# Patient Record
Sex: MALE | Race: WHITE
[De-identification: names, ages, dates, MRNs, and addresses within clinical notes are randomized per-mention and may not be internally consistent; named-entity substitution may affect disease eponyms.]

---

## 2018-08-25 ENCOUNTER — HOSPITAL ENCOUNTER (OUTPATIENT)
Dept: HOSPITAL 62 - ER | Age: 63
Setting detail: OBSERVATION
LOS: 2 days | Discharge: HOME | End: 2018-08-27
Attending: INTERNAL MEDICINE | Admitting: INTERNAL MEDICINE
Payer: COMMERCIAL

## 2018-08-25 DIAGNOSIS — E11.51: ICD-10-CM

## 2018-08-25 DIAGNOSIS — Z95.1: ICD-10-CM

## 2018-08-25 DIAGNOSIS — F17.210: ICD-10-CM

## 2018-08-25 DIAGNOSIS — I95.89: ICD-10-CM

## 2018-08-25 DIAGNOSIS — I25.10: ICD-10-CM

## 2018-08-25 DIAGNOSIS — R55: Primary | ICD-10-CM

## 2018-08-25 DIAGNOSIS — Z89.421: ICD-10-CM

## 2018-08-25 DIAGNOSIS — Z91.14: ICD-10-CM

## 2018-08-25 DIAGNOSIS — Z95.828: ICD-10-CM

## 2018-08-25 DIAGNOSIS — R79.1: ICD-10-CM

## 2018-08-25 LAB
ADD MANUAL DIFF: NO
ALBUMIN SERPL-MCNC: 4 G/DL (ref 3.5–5)
ALP SERPL-CCNC: 66 U/L (ref 38–126)
ALT SERPL-CCNC: 24 U/L (ref 21–72)
ANION GAP SERPL CALC-SCNC: 12 MMOL/L (ref 5–19)
APPEARANCE UR: CLEAR
APTT BLD: 27.5 SEC (ref 23.5–35.8)
APTT PPP: YELLOW S
AST SERPL-CCNC: 18 U/L (ref 17–59)
BARBITURATES UR QL SCN: NEGATIVE
BASOPHILS # BLD AUTO: 0.1 10^3/UL (ref 0–0.2)
BASOPHILS NFR BLD AUTO: 0.9 % (ref 0–2)
BILIRUB DIRECT SERPL-MCNC: 0.2 MG/DL (ref 0–0.4)
BILIRUB SERPL-MCNC: 0.6 MG/DL (ref 0.2–1.3)
BILIRUB UR QL STRIP: NEGATIVE
BUN SERPL-MCNC: 12 MG/DL (ref 7–20)
CALCIUM: 9.8 MG/DL (ref 8.4–10.2)
CHLORIDE SERPL-SCNC: 103 MMOL/L (ref 98–107)
CK MB SERPL-MCNC: 3.25 NG/ML (ref ?–4.55)
CK SERPL-CCNC: 69 U/L (ref 55–170)
CO2 SERPL-SCNC: 25 MMOL/L (ref 22–30)
EOSINOPHIL # BLD AUTO: 0.3 10^3/UL (ref 0–0.6)
EOSINOPHIL NFR BLD AUTO: 2.3 % (ref 0–6)
ERYTHROCYTE [DISTWIDTH] IN BLOOD BY AUTOMATED COUNT: 15.8 % (ref 11.5–14)
ETHANOL SERPL-MCNC: < 10 MG/DL
GLUCOSE SERPL-MCNC: 153 MG/DL (ref 75–110)
GLUCOSE UR STRIP-MCNC: NEGATIVE MG/DL
HCT VFR BLD CALC: 39.6 % (ref 37.9–51)
HGB BLD-MCNC: 13.2 G/DL (ref 13.5–17)
INR PPP: 0.97
KETONES UR STRIP-MCNC: NEGATIVE MG/DL
LYMPHOCYTES # BLD AUTO: 3.4 10^3/UL (ref 0.5–4.7)
LYMPHOCYTES NFR BLD AUTO: 27.1 % (ref 13–45)
MCH RBC QN AUTO: 28.9 PG (ref 27–33.4)
MCHC RBC AUTO-ENTMCNC: 33.3 G/DL (ref 32–36)
MCV RBC AUTO: 87 FL (ref 80–97)
METHADONE UR QL SCN: NEGATIVE
MONOCYTES # BLD AUTO: 1.2 10^3/UL (ref 0.1–1.4)
MONOCYTES NFR BLD AUTO: 9.4 % (ref 3–13)
NEUTROPHILS # BLD AUTO: 7.6 10^3/UL (ref 1.7–8.2)
NEUTS SEG NFR BLD AUTO: 60.3 % (ref 42–78)
NITRITE UR QL STRIP: NEGATIVE
PCP UR QL SCN: NEGATIVE
PH UR STRIP: 5 [PH] (ref 5–9)
PLATELET # BLD: 292 10^3/UL (ref 150–450)
POTASSIUM SERPL-SCNC: 4.6 MMOL/L (ref 3.6–5)
PROT SERPL-MCNC: 7.2 G/DL (ref 6.3–8.2)
PROT UR STRIP-MCNC: NEGATIVE MG/DL
PROTHROMBIN TIME: 13.4 SEC (ref 11.4–15.4)
RBC # BLD AUTO: 4.56 10^6/UL (ref 4.35–5.55)
SODIUM SERPL-SCNC: 140.3 MMOL/L (ref 137–145)
SP GR UR STRIP: 1.04
TOTAL CELLS COUNTED % (AUTO): 100 %
TROPONIN I SERPL-MCNC: < 0.012 NG/ML
URINE AMPHETAMINES SCREEN: NEGATIVE
URINE BENZODIAZEPINES SCREEN: NEGATIVE
URINE COCAINE SCREEN: NEGATIVE
URINE MARIJUANA (THC) SCREEN: NEGATIVE
UROBILINOGEN UR-MCNC: NEGATIVE MG/DL (ref ?–2)
WBC # BLD AUTO: 12.6 10^3/UL (ref 4–10.5)

## 2018-08-25 PROCEDURE — 85025 COMPLETE CBC W/AUTO DIFF WBC: CPT

## 2018-08-25 PROCEDURE — 82962 GLUCOSE BLOOD TEST: CPT

## 2018-08-25 PROCEDURE — 82553 CREATINE MB FRACTION: CPT

## 2018-08-25 PROCEDURE — G0378 HOSPITAL OBSERVATION PER HR: HCPCS

## 2018-08-25 PROCEDURE — 83880 ASSAY OF NATRIURETIC PEPTIDE: CPT

## 2018-08-25 PROCEDURE — 85730 THROMBOPLASTIN TIME PARTIAL: CPT

## 2018-08-25 PROCEDURE — 93010 ELECTROCARDIOGRAM REPORT: CPT

## 2018-08-25 PROCEDURE — 85027 COMPLETE CBC AUTOMATED: CPT

## 2018-08-25 PROCEDURE — 81001 URINALYSIS AUTO W/SCOPE: CPT

## 2018-08-25 PROCEDURE — 93005 ELECTROCARDIOGRAM TRACING: CPT

## 2018-08-25 PROCEDURE — 80053 COMPREHEN METABOLIC PANEL: CPT

## 2018-08-25 PROCEDURE — 83036 HEMOGLOBIN GLYCOSYLATED A1C: CPT

## 2018-08-25 PROCEDURE — 71275 CT ANGIOGRAPHY CHEST: CPT

## 2018-08-25 PROCEDURE — 80048 BASIC METABOLIC PNL TOTAL CA: CPT

## 2018-08-25 PROCEDURE — 36415 COLL VENOUS BLD VENIPUNCTURE: CPT

## 2018-08-25 PROCEDURE — 80307 DRUG TEST PRSMV CHEM ANLYZR: CPT

## 2018-08-25 PROCEDURE — 82550 ASSAY OF CK (CPK): CPT

## 2018-08-25 PROCEDURE — 85610 PROTHROMBIN TIME: CPT

## 2018-08-25 PROCEDURE — 84484 ASSAY OF TROPONIN QUANT: CPT

## 2018-08-25 PROCEDURE — 99291 CRITICAL CARE FIRST HOUR: CPT

## 2018-08-25 PROCEDURE — 71045 X-RAY EXAM CHEST 1 VIEW: CPT

## 2018-08-25 NOTE — EKG REPORT
SEVERITY:- ABNORMAL ECG -

SINUS RHYTHM

CONSIDER ANTEROSEPTAL INFARCT

:

Confirmed by: Roxana Howard 25-Aug-2018 20:49:45

## 2018-08-25 NOTE — ER DOCUMENT REPORT
ED Syncope and Near Syncope





- General


Chief Complaint: Near Syncope


Stated Complaint: NEAR SYNCOPE


Time Seen by Provider: 08/25/18 16:28


Notes: 





63-year-old male to emergency department chief complaint of near syncope.  

Patient states that he is new in town.  Moved from [place].  Has not seen 

a regular doctor.  On Coumadin.  Had stent placed in his left lower extremity 

due to vascular occlusions approximately 6 weeks ago.  Is supposed to be on 

Coumadin.  Is supposed to be on a bunch of medications.  He has stopped some of 

them but does not know if that is what is going on.  He was started on some 

Wellbutrin.  Took it for about 3 or 4 days and he did not like the way it made 

him feel so stopped it yesterday.  Has not felt well for couple of days.  Did 

not feel like eating today but went to a bar and had some beer and felt a 

little dizzy and felt like he was going to pass out.  Denies passing out.  

Denies any chest pain or shortness of breath.  Denies any symptoms at this 

time.  States that he feels better after getting some IV fluids by EMS.


TRAVEL OUTSIDE OF THE [place] IN LAST 30 DAYS: No





- HPI


Symptoms prior to episode: Dizziness, Sweaty





- Related Data


Allergies/Adverse Reactions: 


 





No Known Allergies Allergy (Verified 08/25/18 16:32)


 











Past Medical History





- General


Information source: Patient





- Social History


Smoking Status: Unknown if Ever Smoked


Frequency of alcohol use: Social


Drug Abuse: None


Lives with: Spouse/Significant other


Family History: Reviewed & Not Pertinent


Patient has suicidal ideation: No


Patient has homicidal ideation: No


Endocrine Medical History: Reports: Hx Diabetes Mellitus Type 2


Renal/ Medical History: Denies: Hx Peritoneal Dialysis


Past Surgical History: Reports: Hx Cardiac Catheterization - stent, Hx Cardiac 

Surgery - CABG





Review of Systems





- Review of Systems


Notes: 





Constitutional: denies: Chills, Diaphoresis, Fever, Malaise,. Complain of 

Weakness





EENT: denies: Eye discharge, Blurred vision, Tearing, Double vision, Nose 

congestion, Nose discharge, Throat swelling, Mouth pain





Cardiovascular: denies: Palpitations, Heart racing, Orthopnea, Dyspnea, Chest 

pain.  complains of dizziness and almost passing out





Respiratory: denies: Cough, Hurts to breathe, Wheezing, Shortness of breath





Gastrointestinal: denies: Abdominal pain, Diarrhea, Nausea, Vomiting, Black 

stools, bright red blood in stool





Genitourinary: denies: Burning, Dysuria, Discharge, Frequency, Flank pain, 

Hematuria





Musculoskeletal/extremities:.  States that had recent vascular surgery to the 

left lower extremity.  Denies: Joint pain, Joint swelling, Muscle pain, Muscle 

stiffness, back pain





Hematologic/Lymphatic:  denies: Anemia, Easy bleeding, Easy bruising, Blood 

clots





Neurological/Psychological: denies: Confusion, Dementia, Depression, Loss of 

consciousness





Skin: No lesions, no masses, no skin breakdown, no abscesses





Physical Exam





- Vital signs


Vitals: 


 











Temp Resp Pulse Ox


 


 98.1 F   18   100 


 


 08/25/18 16:25  08/25/18 16:25  08/25/18 16:25











Interpretation: Normal





- General


General appearance: Appears well, Alert





- HEENT


Head: Normocephalic, Atraumatic


Eyes: Normal


Pupils: PERRL





- Respiratory


Respiratory status: No respiratory distress


Chest status: Nontender


Breath sounds: Normal


Chest palpation: Normal





- Cardiovascular


Rhythm: Regular


Heart sounds: Normal auscultation


Murmur: No





- Abdominal


Inspection: Normal


Distension: No distension


Bowel sounds: Normal


Tenderness: Nontender


Organomegaly: No organomegaly





- Back


Back: Normal, Nontender





- Extremities


General upper extremity: Normal inspection, Nontender, Normal color, Normal ROM

, Normal temperature


General lower extremity: Other - There are healing incisions bilateral left 

lower extremity.  There is no active drainage noted at this time.  Not tense.  

Posterior tibialis pulses present in the left lower extremity.





- Neurological


Neuro grossly intact: Yes


Cognition: Normal


Orientation: AAOx4


[place] Coma Scale Eye Opening: Spontaneous


[place] Coma Scale Verbal: Oriented


[place] Coma Scale Motor: Obeys Commands


[place] Coma Scale Total: 15


Speech: Normal


Motor strength normal: LUE, RUE, LLE, RLE


Sensory: Normal





- Psychological


Associated symptoms: Normal affect, Normal mood





- Skin


Skin Temperature: Warm


Skin Moisture: Dry


Skin Color: Normal





Course





- Re-evaluation


Re-evalutation: 





08/25/18 18:39


Patient's has a slightly elevated WBC count.  Was subtherapeutic on his INR 

with an INR of 0.9 with recent vascular surgery in the last 6 weeks there was 

concern that it could potentially have pulmonary embolism causing his episodes 

of CT angiogram was performed which was unremarkable.  Chest x-ray 

unremarkable.  CTA negative for significant pulmonary embolism.  Blood pressure 

transiently low heart rate is 67.  Alcohol level was unremarkable.  Getting 

fluid bolus at this time.


08/25/18 19:15


Patient reevaluated.  Feeling a little bit better.  Based on the fact the 

patient had a near syncopal episode, as a patient with a recent surgery, has a 

history of cardiac disease, had transient hypotension.  Full more comfortable 

observing overnight for repeat cardiac labs and at least cardiac monitoring.  

Will consult with hospitalist for admit at this time.





- Vital Signs


Vital signs: 


 











Temp Pulse Resp BP Pulse Ox


 


 98.1 F      19   132/57 H  99 


 


 08/25/18 16:25     08/25/18 19:21  08/25/18 19:21  08/25/18 19:21














- Laboratory


Result Diagrams: 


 08/25/18 16:27





 08/25/18 17:10


Laboratory results interpreted by me: 


 











  08/25/18 08/25/18





  16:27 17:10


 


WBC  12.6 H 


 


Hgb  13.2 L 


 


RDW  15.8 H 


 


Glucose   153 H














- EKG Interpretation by Me


EKG shows normal: Sinus rhythm, Axis, Intervals, QRS Complexes, ST-T Waves





Critical Care Note





- Critical Care Note


Total time excluding time spent on procedures (mins): 35


Comments: 





hypotension, near syncope





Discharge





- Discharge


Clinical Impression: 


 Syncope, near, Transient hypotension





Condition: Good


Disposition: ADMITTED AS OBSERVATION


Admitting Provider: Hospitalist [facility]


Unit Admitted: Telemetry

## 2018-08-25 NOTE — PDOC H&P
History of Present Illness


Admission Date/PCP: 





None


Patient complains of: near syncope


History of Present Illness: 


ANTOINETTE BROOKS is a 63 year old male who comes to the emergency department 

after an episode of near syncope.  Patient tells me that he has not eaten 

anything since yesterday, he did not have breakfast but took all his medications

, after noon decided to go to a bar, he had one beer and felt dizzy, lightheaded

, girlfriend noticed him pale.  Decided to come to the ED.


Patient does not see her regular doctor.  Patient is on Coumadin but his inr is 

supratherapeutic.  Patient moved from [place]. States he is on many of 

medications, on Wellbutrin recently which he is stopped as did not make him 

feel good.


Denies shortness of breath, chest pain, abdominal pain, nausea, vomiting, fever

, chills, diarrhea, dysuria, hematuria or frequency.


Transient hypotension which improves after IV fluids





CTA negative PE, EKG sinus rhythm, first set of troponins negative.  Chest x-

ray unremarkable





Past Medical History


Cardiac Medical History: Reports: Coronary Artery Disease - CABG x3, Peripheral 

Vascular Disease - Left lower extremity stent placed about 2 months ago. Right 

lower extremity


Endocrine Medical History: Reports: Diabetes Mellitus Type 2


Psychiatric Medical History: Reports: Depression





Past Surgical History


Past Surgical History: Reports: Cardiac Catheterization - stent, Cholecystectomy

, Coronary Artery Bypass Graft - x3, Other - Right toe amputation as diabetic 

complication





Social History


Information Source: Patient


Lives with: Spouse/Significant other


Smoking Status: Current Every Day Smoker - Pack per day


Frequency of Alcohol Use: Social


Drugs: None





Family History


Family History: Reviewed & Not Pertinent


Parental Family History Reviewed: No - He did not meet his parents


Children Family History Reviewed: NA


Sibling(s) Family History Reviewed.: Yes - Negative





Medication/Allergy


Allergies/Adverse Reactions: 


 





No Known Allergies Allergy (Verified 08/25/18 16:32)


 











Review of Systems


Review of Systems: 





As outlined in the HPI, all others negative





Physical Exam


Vital Signs: 


 











Temp Pulse Resp BP Pulse Ox


 


 98.1 F      14   118/58 L  98 


 


 08/25/18 16:25     08/25/18 20:01  08/25/18 20:01  08/25/18 19:41








 Intake & Output











 08/24/18 08/25/18 08/26/18





 06:59 06:59 06:59


 


Weight   83.2 kg











Additional comments: 


General appearance: Well-developed,  alert and cooperative, and appears to be 

in no acute distress


Head: Normocephalic


Eyes: PEERL, EOMI, vision is grossly intact.  Ears: External auditory canal and 

tympanic membranes clear, hearing grossly intact.  Nose: No nasal discharge.  

Throat: Oral cavity and pharynx normal.  No inflammation, swelling, exudate or 

lesions.


Neck: Neck supple, nontender without lymphadenopathy, masses or thyromegaly.


Cardiac: Normal S1 and S2.  No S3, S4 or murmurs.  Rhythm is regular.  There is 

1+ edema, cyanosis or pallor.  Extremities are warm and well perfused.  

Capillary refill is less than 2 seconds.  No carotid bruits.


Lungs: Clear to auscultation and percussion without rales, mild rhonchi, mild 

wheezing, minimal diminished breath sounds.  Not using accessory muscles.


Abdomen: Positive bowel sounds.  Soft.  Nondistended, nontender.  No guarding 

or rebound.  No masses.  No hepatosplenomegaly


Extremities: No significant deformity or joint abnormality.  Peripheral pulses 

intact.  Right toe amputation.  Scars from surgery both extremities


Neurological: Cranial nerves II through XII grossly intact.  Strength and 

sensation symmetric and intact throughout.  Reflexes 2+ throughout.


Skin: Skin normal color, texture and turgor with no lesions or eruptions, warm 

and dry.


Psychiatric:  The mental examination revealed the patient was oriented to person

, place, and time.  The patient was able to demonstrate good judgment on recent

, without hallucinations, abnormal affect or abnormal behaviors.





Results


Laboratory Results: 


 





 08/25/18 16:27 





 08/25/18 17:10 





 











  08/25/18 08/25/18 08/25/18





  16:27 16:27 17:10


 


WBC  12.6 H  


 


RBC  4.56  


 


Hgb  13.2 L  


 


Hct  39.6  


 


MCV  87  


 


MCH  28.9  


 


MCHC  33.3  


 


RDW  15.8 H  


 


Plt Count  292  


 


Seg Neutrophils %  60.3  


 


Lymphocytes %  27.1  


 


Monocytes %  9.4  


 


Eosinophils %  2.3  


 


Basophils %  0.9  


 


Absolute Neutrophils  7.6  


 


Absolute Lymphocytes  3.4  


 


Absolute Monocytes  1.2  


 


Absolute Eosinophils  0.3  


 


Absolute Basophils  0.1  


 


Sodium   Cancelled  140.3


 


Potassium   Cancelled  4.6


 


Chloride   Cancelled  103


 


Carbon Dioxide   Cancelled  25


 


Anion Gap   Cancelled  12


 


BUN   Cancelled  12


 


Creatinine   Cancelled  1.04


 


Est GFR ( Amer)   Cancelled  > 60


 


Est GFR (Non-Af Amer)   Cancelled  > 60


 


Glucose   Cancelled  153 H


 


Calcium   Cancelled  9.8


 


Total Bilirubin   Cancelled  0.6


 


AST   Cancelled  18


 


ALT   Cancelled  24


 


Alkaline Phosphatase   Cancelled  66


 


Total Protein   Cancelled  7.2


 


Albumin   Cancelled  4.0








 











  08/25/18 08/25/18 08/25/18





  16:27 16:27 17:10


 


Creatine Kinase  Cancelled   69


 


CK-MB (CK-2)   Cancelled 


 


Troponin I   Cancelled 














  08/25/18





  17:10


 


Creatine Kinase 


 


CK-MB (CK-2)  3.25


 


Troponin I  < 0.012











Impressions: 


 





Chest X-Ray  08/25/18 16:30


IMPRESSION:  NO ACUTE RADIOGRAPHIC FINDING IN THE CHEST.


 








Chest/Abdomen CTA  08/25/18 17:24


IMPRESSION:  1.  No evidence of pulmonary embolus or acute cardiopulmonary 

abnormality.


2.  Limited evaluation of the upper abdomen demonstrates cholecystectomy 

changes and pneumobilia.


 














Assessment & Plan





- Diagnosis


(1) Syncope, near


Is this a current diagnosis for this admission?: Yes   


Plan: 


Patient with near syncopal episode which improved IV fluids.  As he has h/o of 

peripheral vascular disease, CAD, felt safe to keep the patient under 

observation, on telemetry monitoring, will do cardiac enzymes 3.  Most likely 

this episode has been triggered by his fasting since the day before and has 

been vasovagal.








(2) Transient hypotension


Is this a current diagnosis for this admission?: Yes   


Plan: 


Improved with IV fluids, will give the patient with IV fluids overnight for 

good hydration.  Orthostatic vital signs were not done in the emergency 

department and the patient already had 1 L of IV fluids.  Will request 

orthostatic vital signs








(3) Diabetes mellitus type 2 in nonobese


Is this a current diagnosis for this admission?: Yes   


Plan: 


Accu-Cheks q. before meals and at bedtime, insulin sliding scale, hypoglycemia 

protocol.  Will resume home diabetic medication.  Hemoglobin A1c in the morning








(4) Peripheral vascular disease


Is this a current diagnosis for this admission?: Yes   


Plan: 


Patient is supposed to be on Coumadin but apparently is noncompliant with his 

medications.  I will reinitiate him on anticoagulation.








(5) Smoker


Is this a current diagnosis for this admission?: Yes   


Plan: 


Will place 21 mg per day of nicotine patch.








- Time


Time Spent: 30 to 50 Minutes

## 2018-08-25 NOTE — RADIOLOGY REPORT (SQ)
EXAM DESCRIPTION:  CHEST SINGLE VIEW



COMPLETED DATE/TIME:  8/25/2018 4:46 pm



REASON FOR STUDY:  syncope



COMPARISON:  None.



EXAM PARAMETERS:  NUMBER OF VIEWS: One view.

TECHNIQUE: Single frontal radiographic view of the chest acquired.

RADIATION DOSE: NA

LIMITATIONS: None.



FINDINGS:  LUNGS AND PLEURA: No opacities, masses or pneumothorax. No pleural effusion.

MEDIASTINUM AND HILAR STRUCTURES: No masses.  Contour normal.

HEART AND VASCULAR STRUCTURES: Heart normal in size.  Normal vasculature.

BONES: No acute findings.

HARDWARE: Patient is status post median sternotomy.

OTHER: No other significant finding.



IMPRESSION:  NO ACUTE RADIOGRAPHIC FINDING IN THE CHEST.



TECHNICAL DOCUMENTATION:  JOB ID:  [id number]

 2011 [employer]- All Rights Reserved



Reading location - IP/workstation name: [email or web address]

## 2018-08-25 NOTE — RADIOLOGY REPORT (SQ)
EXAM DESCRIPTION:  CTA CHEST



COMPLETED DATE/TIME:  8/25/2018 6:05 pm



REASON FOR STUDY:  sob, recent sx, near syncope



COMPARISON:  Chest x-ray 8/25/2018



TECHNIQUE:  CT scan of the chest performed using helical scanning technique with dynamic intravenous 
contrast injection.  Images reviewed with lung, soft tissue and bone windows.  Reconstructed coronal 
and sagittal MPR images reviewed.

Additional 3 dimensional post-processing performed to develop Maximal Intensity Projection images (MI
P).  All images stored on PACS.

All CT scanners at this facility use dose modulation, iterative reconstruction, and/or weight based d
osing when appropriate to reduce radiation dose to as low as reasonably achievable (ALARA).

[facility]: Dose Right  [facility]: CareDose    [facility]: Dose Right    [facility]: Teradose 4D    [facility]: Smart Technologies



CONTRAST TYPE AND DOSE:  contrast/concentration: Isovue 350.00 mg/ml; Total Contrast Delivered: 76.0 
ml; Total Saline Delivered: 80.0 ml

Contrast bolus optimized for the pulmonary arteries. Not diagnostic for the aorta.



RENAL FUNCTION:  BUN 12; creatinine 1.04



RADIATION DOSE:  CT Rad equipment meets quality standard of care and radiation dose reduction techniq
ues were employed. CTDIvol: 14.6 - 33.1 mGy. DLP: 664 mGy-cm. .



LIMITATIONS:  None.



FINDINGS:  LUNGS AND PLEURA: No masses, infiltrates, or pneumothorax.  No pleural effusions or pleura
l calcifications.

AORTA AND GREAT VESSELS: No aneurysm.  Contrast bolus not optimized for the aorta.

HEART: No pericardial effusion. Coronary stents are present.

PULMONARY ARTERIES: No emboli visualized in the main pulmonary arteries or the segmental branches.

HILAR AND MEDIASTINAL STRUCTURES: No identified masses or abnormal nodes.

HARDWARE: Median sternotomy wires.

UPPER ABDOMEN: Limited exam.  Status post cholecystectomy.  With a dilated common bile duct and pneum
obilia.

THYROID AND OTHER SOFT TISSUES: No masses.  No adenopathy.

BONES: No acute or significant finding.

3D MIPS: Confirm above findings.

OTHER: No other significant finding.



IMPRESSION:  1.  No evidence of pulmonary embolus or acute cardiopulmonary abnormality.

2.  Limited evaluation of the upper abdomen demonstrates cholecystectomy changes and pneumobilia.



COMMENT:  Quality ID # [id number]: Final reports with documentation of one or more dose reduction techniques
 (e.g., Automated exposure control, adjustment of the mA and/or kV according to patient size, use of 
iterative reconstruction technique)



TECHNICAL DOCUMENTATION:  JOB ID:  [id number]

 2011 [employer]- All Rights Reserved



Reading location - IP/workstation name: [email or web address]

## 2018-08-26 LAB
ANION GAP SERPL CALC-SCNC: 8 MMOL/L (ref 5–19)
BUN SERPL-MCNC: 12 MG/DL (ref 7–20)
CALCIUM: 9 MG/DL (ref 8.4–10.2)
CHLORIDE SERPL-SCNC: 108 MMOL/L (ref 98–107)
CO2 SERPL-SCNC: 25 MMOL/L (ref 22–30)
GLUCOSE SERPL-MCNC: 165 MG/DL (ref 75–110)
POTASSIUM SERPL-SCNC: 4.1 MMOL/L (ref 3.6–5)
SODIUM SERPL-SCNC: 140.9 MMOL/L (ref 137–145)

## 2018-08-26 RX ADMIN — SODIUM CHLORIDE PRN MLS/HR: 9 INJECTION, SOLUTION INTRAVENOUS at 14:47

## 2018-08-26 RX ADMIN — ENOXAPARIN SODIUM SCH MG: 80 INJECTION SUBCUTANEOUS at 21:27

## 2018-08-26 NOTE — PDOC PROGRESS REPORT
Subjective


Progress Note for:: 08/26/18


Subjective:: 


63 y.o. M who presented to the ED 8/25 for a near syncopal episode.  PMH 

includes CAD, CABG x3 (ASA and plavix), PAD - Left lower extremity stent placed 

about 2 months ago (Coumadin). Right lower extremity bypass graft "years ago."





The patient was seen this morning on rounds, states he is feeling 'much better' 

following IVF resuscitation.  Syncopal episode believed to be related to 

anorexia 24 hours followed by consumption of EtOH.  Unfortunately, the patient'

s INR is subtherapeutic (0.94).  The patient states he last had his INR checked 

3 weeks ago in [place] and his levels were 'normal.'  Since moving to 

[place], [place] 3 weeks ago, the patient has not established himself with a 

primary care doctor.  The patient admits that he is close to running out of his 

Coumadin but is very adamant that he has been taking the medication as 

directed. 





Plan to discharge the patient home on Lovenox to bridge to therapeutic Coumadin 

dosing. Will require assistance from discharge planning because the patient 

previously had Medicaid when living in [place], and his insurance coverage did not 

transfer to [place]. 


Reason For Visit: 


NEAR SYNCOPE








Physical Exam


Vital Signs: 


 











Temp Pulse Resp BP Pulse Ox


 


 97.7 F   63   16   135/49 H  99 


 


 08/26/18 15:23  08/26/18 15:23  08/26/18 15:23  08/26/18 15:23  08/26/18 15:23








 Intake & Output











 08/25/18 08/26/18 08/27/18





 06:59 06:59 06:59


 


Intake Total   1240


 


Output Total  675 300


 


Balance  -675 940











General appearance: PRESENT: no acute distress, well-developed, well-nourished


Head exam: PRESENT: atraumatic, normocephalic


Eye exam: PRESENT: conjunctiva pink, EOMI, PERRLA.  ABSENT: scleral icterus


Ear exam: PRESENT: normal external ear exam


Mouth exam: PRESENT: moist, tongue midline


Neck exam: ABSENT: carotid bruit, JVD, lymphadenopathy, thyromegaly


Respiratory exam: PRESENT: clear to auscultation hiro.  ABSENT: rales, rhonchi, 

wheezes


Cardiovascular exam: PRESENT: RRR.  ABSENT: diastolic murmur, rubs, systolic 

murmur


Pulses: PRESENT: normal dorsalis pedis pul


Vascular exam: PRESENT: normal capillary refill


GI/Abdominal exam: PRESENT: normal bowel sounds, soft.  ABSENT: distended, 

guarding, mass, organolmegaly, rebound, tenderness


Rectal exam: PRESENT: deferred


Extremities exam: PRESENT: full ROM.  ABSENT: calf tenderness, clubbing, pedal 

edema


Musculoskeletal exam: PRESENT: ambulatory, full ROM


Neurological exam: PRESENT: alert, awake, oriented to person, oriented to place

, oriented to time, oriented to situation


Psychiatric exam: PRESENT: appropriate affect, normal mood.  ABSENT: homicidal 

ideation, suicidal ideation


Skin exam: PRESENT: dry, intact, warm.  ABSENT: cyanosis, rash





Results


Laboratory Results: 


 





 08/26/18 02:40 





 











  08/25/18 08/26/18





  20:51 02:40


 


Sodium   140.9


 


Potassium   4.1


 


Chloride   108 H


 


Carbon Dioxide   25


 


Anion Gap   8


 


BUN   12


 


Creatinine   0.74


 


Est GFR ( Amer)   > 60


 


Est GFR (Non-Af Amer)   > 60


 


Glucose   165 H


 


Calcium   9.0


 


Urine Color  YELLOW 


 


Urine Appearance  CLEAR 


 


Urine pH  5.0 


 


Ur Specific Gravity  1.042 


 


Urine Protein  NEGATIVE 


 


Urine Glucose (UA)  NEGATIVE 


 


Urine Ketones  NEGATIVE 


 


Urine Blood  NEGATIVE 


 


Urine Nitrite  NEGATIVE 


 


Ur Leukocyte Esterase  NEGATIVE 


 


Urine WBC (Auto)  1 


 


Urine RBC (Auto)  0 








 











  08/26/18 08/26/18 08/26/18





  02:40 13:22 13:22


 


Troponin I  < 0.012  < 0.012 


 


NT-Pro-B Natriuret Pep    402











Impressions: 


 





Chest X-Ray  08/25/18 16:30


IMPRESSION:  NO ACUTE RADIOGRAPHIC FINDING IN THE CHEST.


 








Chest/Abdomen CTA  08/25/18 17:24


IMPRESSION:  1.  No evidence of pulmonary embolus or acute cardiopulmonary 

abnormality.


2.  Limited evaluation of the upper abdomen demonstrates cholecystectomy 

changes and pneumobilia.


 











Status: Imported from PACS





Assessment & Plan





- Diagnosis


(1) Syncope, near


Is this a current diagnosis for this admission?: Yes   


Plan: 


Witnessed near syncopal episode


Etiology believed to be secondary to anorexia times 24 hours followed by EtOH 

consumption


CT head negative


Significant improvement with IVF resuscitation


Normal orthostatic vital signs








(2) Subtherapeutic anticoagulation


Is this a current diagnosis for this admission?: Yes   


Plan: 


INR 9.4


Patient states he takes his Coumadin as directed


Resume home dosing schedule


Initiate Lovenox SC 1mg/kg 





Will require SC Lovenox upon discharge to bridge to therapeutic Coumadin level


Will require daily/QOD INR checks until therapeutic





Requesting assistance from discharge planning as patient does not have insurance


Barring any complications, plan to discharge home tomorrow








(3) Peripheral vascular disease


Is this a current diagnosis for this admission?: Yes   


Plan: 


Patient endorses history of PAD


History of bypass graft in RLE


Recent stent placement in LLE in June 2018 at [facility] in [place]


Continue home dose warfarin








- Time


Time Spent with patient: 25-34 minutes


Medications reviewed and adjusted accordingly: Yes


Anticipated discharge: Home


Within: within 24 hours





- Inpatient Certification


Based on my medical assessment, after consideration of the patient's 

comorbidities, presenting symptoms, or acuity I expect that the services needed 

warrant INPATIENT care.: Yes


I certify that my determination is in accordance with my understanding of 

Medicare's requirements for reasonable and necessary INPATIENT services [42 CFR 

412.3e].: Yes


Medical Necessity: Risk of Complication if Not Cared For in Hospital





- Plan Summary


Plan Summary: 





In need of assistance from discharge planning services to set this patient up 

with SC Lovenox injections to bridge to therapeutic INR on Coumadin.  While on 

Lovenox, patient will require daily/QOD INR checks by laboratory staff.  

Patient currently does not have health insurance as he just moved to [place] from [place], where he was covered under the [employer] [facility] [place] Medicaid. 





If the patient is not able to afford SC Lovenox, then he will need to remain 

inpatient until his INR levels are therapeutic.

## 2018-08-27 VITALS — DIASTOLIC BLOOD PRESSURE: 49 MMHG | SYSTOLIC BLOOD PRESSURE: 142 MMHG

## 2018-08-27 LAB
ERYTHROCYTE [DISTWIDTH] IN BLOOD BY AUTOMATED COUNT: 15.9 % (ref 11.5–14)
HCT VFR BLD CALC: 35.6 % (ref 37.9–51)
HGB BLD-MCNC: 12 G/DL (ref 13.5–17)
MCH RBC QN AUTO: 28.9 PG (ref 27–33.4)
MCHC RBC AUTO-ENTMCNC: 33.8 G/DL (ref 32–36)
MCV RBC AUTO: 86 FL (ref 80–97)
PLATELET # BLD: 210 10^3/UL (ref 150–450)
RBC # BLD AUTO: 4.16 10^6/UL (ref 4.35–5.55)
WBC # BLD AUTO: 9.1 10^3/UL (ref 4–10.5)

## 2018-08-27 RX ADMIN — ENOXAPARIN SODIUM SCH MG: 80 INJECTION SUBCUTANEOUS at 10:33

## 2018-08-27 RX ADMIN — SODIUM CHLORIDE PRN MLS/HR: 9 INJECTION, SOLUTION INTRAVENOUS at 04:34

## 2018-08-27 NOTE — PHYSICIAN ADVISORY NOTE
Physician Advisor ProgressNote


.: 


Pursuant to the  plan for [facility], I have reviewed the medical record 

for this patient.  





Physician Advisor Statement: 





Pt w/near-syncope.  Received 1 dose warfarin on 8/25, none since.  Home meds 

list includes Coreg & warfarin, so persistently low-nl HRs may be a SE of beta 

blocker rather than an intrinsic problem in setting of initial hypotension.





Status:


[place], not yet with payer source in [place].


Documentation seems to indicate that pt was ready for d/c on 8/26, but needing 

DCP arrangements in place/confirmed w/free clinic (not open on Sun.s) to be 

sure he would be able to access needed meds (Lovenox, ...) that would keep him 

safe outside the hospital.  Low intensity of service (NS @75, Lovenox).  


 - Sounds most appropriate for Obs initially.  


 - If there is a clinical issue today that requires him to stay in hospital for 

tx/monitoring (something that couldn't be taken care of w/daily clinic/outpt 

visits & outpt meds), please document it explicitly, and then may consider 

change to Inpatient status.








Thanks!


CK

## 2018-10-30 ENCOUNTER — HOSPITAL ENCOUNTER (OUTPATIENT)
Dept: HOSPITAL 62 - RAD | Age: 63
End: 2018-10-30
Attending: SURGERY
Payer: COMMERCIAL

## 2018-10-30 DIAGNOSIS — I73.9: Primary | ICD-10-CM

## 2018-10-30 PROCEDURE — 93925 LOWER EXTREMITY STUDY: CPT

## 2018-10-31 NOTE — XCELERA REPORT
[address] [address]

                               Tel: [phone]

                               Fax: [phone]



                      Lower Extremity Arterial Evaluation

_______________________________________________________________________________



Name: ANTOINETTE BROOKS

MRN: [id number]                                       Age: 63 yrs

Gender: Male                                          : 1955

Patient Status: Outpatient                            Patient Location: RAD

Account #: [id number]

Study Date: 10/30/2018 10:51 AM

                          Accession #: [id number]

_______________________________________________________________________________

Procedure: A color flow and duplex scan of the lower extremity arteries was

performed bilaterally with velocity and waveform anaylsis. Ankle brachial

indicies performed.

Reason For Study: PVD







Ordering Physician: WILLIAMS, LENNOX

Performed By: Sarah Beth Elias

_______________________________________________________________________________

_______________________________________________________________________________





Measurements and Calculations



                                     Right  Left

                     Prox PFA PSV    198.9  85.2 cm/sec

                     Prox SFA PSV    53.7   55.8 cm/sec

                     Mid SFA PSV     59.4   52.5 cm/sec

                     Dist SFA PSV    73.5   60.5 cm/sec

                     Prox Pop A PSV  65.7   40.0 cm/sec

                     Mid BUSHRA PSV     21.0   29.1 cm/sec

                     Prox PTA PSV    25.8        cm/sec

                     Mid PTA PSV            44.0 cm/sec



_______________________________________________________________________________

Right Side Arterial Evaluation

Normal velocity and triphasic waveforms noted in the Common Femoral artery.

Biphasic with low normal velocities to the Popliteal. Monophasic in the

infrageniculate vessels. artery.



20-49 % stenosis noted, at the Femoral artery. Sequential changes.



Ankle Brachial index 0.88.



Left Side Arterial Evaluation

Normal velocity and biphasic waveforms noted in the Common Femoral artery .

Biphasic with mildly diminished velocities to the infrageniculate vessels.

Monophasic with severely diminished waveform in the Dorsalis Pedis.



20-49 % stenosis at the Femoral artery. With sequential changes.



Ankle Brachial index is 0.92.



_______________________________________________________________________________

Interpretation Summary

Severe hemodynamically significant lesions in the right lower extremity only,

on duplex imaging, at rest. Moderate hemodynamically significant lesions in

the left lower extremity only, on duplex imaging, at rest. VIK's suggest mild

disease and are not concordant with the duplex findings.

_______________________________________________________________________________

Electronically signed by:      Lennox Williams      on 10/31/2018 05:12 PM



CC: WILLIAMS, LENNOX

>

Williams, Lennox

## 2018-12-20 ENCOUNTER — HOSPITAL ENCOUNTER (EMERGENCY)
Dept: HOSPITAL 62 - ER | Age: 63
Discharge: HOME | End: 2018-12-20
Payer: OTHER GOVERNMENT

## 2018-12-20 VITALS — DIASTOLIC BLOOD PRESSURE: 61 MMHG | SYSTOLIC BLOOD PRESSURE: 148 MMHG

## 2018-12-20 DIAGNOSIS — Z90.49: ICD-10-CM

## 2018-12-20 DIAGNOSIS — M54.6: ICD-10-CM

## 2018-12-20 DIAGNOSIS — E11.9: ICD-10-CM

## 2018-12-20 DIAGNOSIS — R10.11: Primary | ICD-10-CM

## 2018-12-20 DIAGNOSIS — F17.200: ICD-10-CM

## 2018-12-20 DIAGNOSIS — Z95.1: ICD-10-CM

## 2018-12-20 LAB
ADD MANUAL DIFF: NO
ALBUMIN SERPL-MCNC: 3.8 G/DL (ref 3.5–5)
ALP SERPL-CCNC: 75 U/L (ref 38–126)
ALT SERPL-CCNC: 87 U/L (ref 21–72)
ANION GAP SERPL CALC-SCNC: 8 MMOL/L (ref 5–19)
APPEARANCE UR: CLEAR
APTT PPP: YELLOW S
AST SERPL-CCNC: 111 U/L (ref 17–59)
BASOPHILS # BLD AUTO: 0.1 10^3/UL (ref 0–0.2)
BASOPHILS NFR BLD AUTO: 0.3 % (ref 0–2)
BILIRUB DIRECT SERPL-MCNC: 0.3 MG/DL (ref 0–0.4)
BILIRUB SERPL-MCNC: 0.5 MG/DL (ref 0.2–1.3)
BILIRUB UR QL STRIP: NEGATIVE
BUN SERPL-MCNC: 27 MG/DL (ref 7–20)
CALCIUM: 10.1 MG/DL (ref 8.4–10.2)
CHLORIDE SERPL-SCNC: 98 MMOL/L (ref 98–107)
CO2 SERPL-SCNC: 31 MMOL/L (ref 22–30)
EOSINOPHIL # BLD AUTO: 0.1 10^3/UL (ref 0–0.6)
EOSINOPHIL NFR BLD AUTO: 0.7 % (ref 0–6)
ERYTHROCYTE [DISTWIDTH] IN BLOOD BY AUTOMATED COUNT: 15.2 % (ref 11.5–14)
GLUCOSE SERPL-MCNC: 250 MG/DL (ref 75–110)
GLUCOSE UR STRIP-MCNC: >=500 MG/DL
HCT VFR BLD CALC: 38.2 % (ref 37.9–51)
HGB BLD-MCNC: 12.6 G/DL (ref 13.5–17)
KETONES UR STRIP-MCNC: NEGATIVE MG/DL
LIPASE SERPL-CCNC: 45.7 U/L (ref 23–300)
LYMPHOCYTES # BLD AUTO: 2.5 10^3/UL (ref 0.5–4.7)
LYMPHOCYTES NFR BLD AUTO: 16.1 % (ref 13–45)
MCH RBC QN AUTO: 28.5 PG (ref 27–33.4)
MCHC RBC AUTO-ENTMCNC: 33.1 G/DL (ref 32–36)
MCV RBC AUTO: 86 FL (ref 80–97)
MONOCYTES # BLD AUTO: 1.1 10^3/UL (ref 0.1–1.4)
MONOCYTES NFR BLD AUTO: 7.2 % (ref 3–13)
NEUTROPHILS # BLD AUTO: 11.9 10^3/UL (ref 1.7–8.2)
NEUTS SEG NFR BLD AUTO: 75.7 % (ref 42–78)
NITRITE UR QL STRIP: NEGATIVE
PH UR STRIP: 5 [PH] (ref 5–9)
PLATELET # BLD: 323 10^3/UL (ref 150–450)
POTASSIUM SERPL-SCNC: 5.1 MMOL/L (ref 3.6–5)
PROT SERPL-MCNC: 6.7 G/DL (ref 6.3–8.2)
PROT UR STRIP-MCNC: NEGATIVE MG/DL
RBC # BLD AUTO: 4.43 10^6/UL (ref 4.35–5.55)
SODIUM SERPL-SCNC: 136.8 MMOL/L (ref 137–145)
SP GR UR STRIP: 1.03
TOTAL CELLS COUNTED % (AUTO): 100 %
UROBILINOGEN UR-MCNC: NEGATIVE MG/DL (ref ?–2)
WBC # BLD AUTO: 15.8 10^3/UL (ref 4–10.5)

## 2018-12-20 PROCEDURE — 99284 EMERGENCY DEPT VISIT MOD MDM: CPT

## 2018-12-20 PROCEDURE — 80053 COMPREHEN METABOLIC PANEL: CPT

## 2018-12-20 PROCEDURE — 83690 ASSAY OF LIPASE: CPT

## 2018-12-20 PROCEDURE — 36415 COLL VENOUS BLD VENIPUNCTURE: CPT

## 2018-12-20 PROCEDURE — 81001 URINALYSIS AUTO W/SCOPE: CPT

## 2018-12-20 PROCEDURE — 94640 AIRWAY INHALATION TREATMENT: CPT

## 2018-12-20 PROCEDURE — 85025 COMPLETE CBC W/AUTO DIFF WBC: CPT

## 2018-12-20 PROCEDURE — 74177 CT ABD & PELVIS W/CONTRAST: CPT

## 2018-12-20 PROCEDURE — 71046 X-RAY EXAM CHEST 2 VIEWS: CPT

## 2018-12-20 NOTE — ER DOCUMENT REPORT
ED General





- General


Stated Complaint: LOWER BACK PAIN


Time Seen by Provider: 12/20/18 16:34


TRAVEL OUTSIDE OF THE [place] IN LAST 30 DAYS: No





- HPI


Notes: 





Patient is a 63-year-old male with a history of type 2 diabetes, chronic back 

pain, mental health disorder, coronary artery disease status post CABG, 

hypertension who presents to the ED for evaluation of right upper quadrant/side 

pain that occurred prior to arrival.  Patient states that he was in the shower 

when he started having back pain which she has had before.  Patient states that 

he took 2 tramadol and lay down for 45 minutes.  Patient states that he then 

moved to the couch.  When he was transitioning from the couch and walking to his

son room patient states that his legs gave out on him, but he did not have any 

loss of consciousness/dizziness/lightheadedness.  Patient states that he was in 

pain at that time during the occurrence.  Patient states that he did feel weak 

and when EMS arrived they noticed that he had a low blood pressure which 

corrected soon thereafter from 67/ 4/74.  Patient states that he was 

placed on a Z-Howard and prednisone 2 days ago for upper respiratory infection, but

did not have a chest x-ray performed.  Patient states that he does continue to 

smoke and has intermittent wheezing as well.  He is otherwise been eating and 

drinking without any difficulties.  He has been urinating normally and having 

normal bowel movements.  Patient states that his pains have completely resolved 

and he is feeling back to normal.  Significant other does state that patient has

had episodes of dementia and delusions.  He is currently on Zyprexa.  No other 

concerns or complaints.  Denies any headache, fever, head injury, neck pain, 

changes in vision/speech/mentation/hearing, URI, sore throat, chest pain, 

palpitations, syncope, cough, shortness of breath, wheeze, dyspnea, 

nausea/vomiting/diarrhea, urinary retention, dysuria, hematuria, loss of control

of bowel or bladder, numbness/tingling, saddle anesthesia, muscle 

paralysis/weakness, or rash.





- Related Data


Allergies/Adverse Reactions: 


                                        





No Known Allergies Allergy (Verified 08/25/18 16:32)


   











Past Medical History





- Social History


Smoking Status: Current Every Day Smoker


Family History: Reviewed & Not Pertinent





- Past Medical History


Cardiac Medical History: Reports: Hx Coronary Artery Disease - CABG x3, Hx P

eripheral Vascular Disease - Left lower extremity stent placed about 2 months 

ago. Right lower extremity


Endocrine Medical History: Reports: Hx Diabetes Mellitus Type 2


Renal/ Medical History: Denies: Hx Peritoneal Dialysis


Psychiatric Medical History: Reports: Hx Depression


Past Surgical History: Reports: Hx Cardiac Catheterization - stent, Hx Cardiac 

Surgery - CABG, Hx Cholecystectomy, Hx Coronary Artery Bypass Graft - x3, Other 

- Right toe amputation as diabetic complication





Review of Systems





- Review of Systems


-: Yes All other systems reviewed and negative





Physical Exam





- Vital signs


Vitals: 


                                        











Temp Pulse Resp BP Pulse Ox


 


 98.1 F   80   16   144/78 H  97 


 


 12/20/18 16:47  12/20/18 16:47  12/20/18 16:47  12/20/18 16:47  12/20/18 16:47














- Notes


Notes: 





PHYSICAL EXAMINATION:  





GENERAL: Well-appearing, well-nourished and in no acute distress.  A&Ox4.  

Answers questions appropriately.





HEAD: Atraumatic, normocephalic.  Non-tender. 





EYES: Pupils equal round and reactive to light, extraocular movements intact, 

sclera anicteric, conjunctiva are normal.  No nystagmus.  vis fields intact.





ENT: EAC clear b/l.  TM's intact b/l without erythema, fluid, or perforation.  

Nares patent and without discharge.  oropharynx clear without exudates.  No 

tonsilar hypertrophy or erythema.  Moist mucous membranes.  No sinus tenderness.







NECK: Normal range of motion, supple without lymphadenopathy.  No rigidity/

meningismus.  No midline tenderness. 





LUNGS: wheezing b/l.  no crackles.





HEART: Regular rate and rhythm without murmurs, rubs, gallops.





ABDOMEN: Soft, nontender, nondistended abdomen.  No guarding, no rebound.  

Normal bowel sounds present.  No CVA tenderness bilaterally.  Cooper neg.  No 

tenderness at McBurney point.





Musculoskeletal: Ext's b/l:  FROM to passive/active. Strength 5+/5.  No deficits

noted.  No bony tenderness of extremities.





Back:  FROM to passive/active.  Strength 5+/5.  No vertebral point tenderness, 

stepoffs, or deformities.  No other bony tenderness, erythema, swelling, or 

ecchymosis.  SLR negative b/l.  No SI jt tenderness.  No foot drop





Extremities:  No cyanosis, clubbing, or edema b/l.  Peripheral pulses 2+.  

Capillary refill less than 2 seconds.





NEUROLOGICAL: NIH 0.  GCS 15.  Cranial nerves grossly intact.  Normal speech, 

normal gait.  Normal sensory, motor exams.  Reflexes 2+ b/l. OPAL's negative.  

Pronator drift negative. Heel/shin, finger/nose wnl. 





PSYCH: Normal mood, normal affect.





SKIN: Warm, Dry, normal turgor, no rashes or lesions noted.








Course





- Re-evaluation


Re-evalutation: 





12/20/18 18:47


Patient is an afebrile, well-hydrated, 63-year-old male who presents to the ED 

with nonspecific right upper quadrant/right back pain that has been since 

resolved.  Vitals are acceptable without any significant tachycardia, tachypnea,

hypoxia, hypotension.  Patient has not had any recurrence of his symptoms.  PE 

is otherwise unremarkable for any focal neurological deficits.  Patient's 

abdomen is soft and nontender.  CBC does show mildly elevated white blood cell 

count.  CMP has minimally elevated LFTs without elevation in his bili.  Lipase 

and chest x-ray are unremarkable.  CT scan did show some air in his hepatic 

ducts which I did review with the surgeon.  He does not believe that the labs 

and CT finding is of any clinical significance due to the normal bilirubin.  I 

also reviewed with Dr. Kincaid who is in agreement with disposition and plan.

 No further labs or imaging warranted at this time.  Pt has never had any CP, 

SOB, MENDOZA.  Patient is nontoxic-appearing and is tolerating p.o. without 

difficulty.  Low suspicion/risk for cauda equina, disc herniation causing severe

spinal stenosis, spinal abscess, meningitis, acute appendicitis, bowel 

obstruction, acute cholecystitis, perforated diverticulitis, incarcerated 

hernia, pancreatitis, perforated ulcer, peritonitis, sepsis, testicular torsion,

or other systemic emergent condition at this time.  Patient is aware that his 

condition can change from initial presentation and he needs to monitor symptoms 

closely and seek medical attention if any acute changes.  Conservative measures 

otherwise for symptoms.  Recheck with PCM in 3-5 days.  Return to the ED with 

any worsening/concerning symptoms otherwise as reviewed in discharge.  Patient 

is in agreement.








- Vital Signs


Vital signs: 


                                        











Temp Pulse Resp BP Pulse Ox


 


 98.1 F   80   16   144/78 H  97 


 


 12/20/18 16:47  12/20/18 16:47  12/20/18 16:47  12/20/18 16:47  12/20/18 16:47














- Laboratory


Result Diagrams: 


                                 12/20/18 15:48





                                 12/20/18 15:48


Laboratory results interpreted by me: 


                                        











  12/20/18 12/20/18 12/20/18





  15:48 15:48 18:09


 


WBC  15.8 H  


 


Hgb  12.6 L  


 


RDW  15.2 H  


 


Absolute Neutrophils  11.9 H  


 


Sodium   136.8 L 


 


Potassium   5.1 H 


 


Carbon Dioxide   31 H 


 


BUN   27 H 


 


Glucose   250 H 


 


AST   111 H 


 


ALT   87 H 


 


Urine Glucose (UA)    >=500 H














Discharge





- Discharge


Clinical Impression: 


 Right upper quadrant abdominal pain





Thoracic back pain


Qualifiers:


 Chronicity: acute Back pain laterality: right Qualified Code(s): M54.6 - Pain 

in thoracic spine





Condition: Stable


Disposition: HOME, SELF-CARE


Instructions:  Abdominal Pain ([facility])


Additional Instructions: 


Maintain adequate fluid and food intake


healthy diet


tylenol/Motrin if needed


Monitor for any worsening symptoms


Make sure you are staying hydrated enough to urinate and have normal BM's


Recheck with your PCM in 2-3 days


Return to the ED with any worsening symptoms and/or development of fever, 

headache, changes in behavior/mentation/vision/speech, chest pain, palpitations,

syncope, shortness of breath, trouble breathing, abdominal pain, n/v/d, blood in

stool/urine, loss of control of bowel/bladder, urinary retention, muscle 

weakness/paralysis, saddle anesthesia, numbness/tingling, or other worsening 

symptoms that are concerning to you.


Forms:  Elevated Blood Pressure


Referrals: 


TEN TOMAS MD [ACTIVE STAFF] - Follow up as needed


ESTHER CRAWLEY MD [ACTIVE STAFF] - Follow up as needed

## 2018-12-20 NOTE — RADIOLOGY REPORT (SQ)
EXAM DESCRIPTION:  CHEST 2 VIEWS



COMPLETED DATE/TIME:  12/20/2018 4:54 pm



REASON FOR STUDY:  cough/wheeze



COMPARISON:  None.



EXAM PARAMETERS:  NUMBER OF VIEWS: two views

TECHNIQUE: Digital Frontal and Lateral radiographic views of the chest acquired.

RADIATION DOSE: NA

LIMITATIONS: none



FINDINGS:  LUNGS AND PLEURA: No opacities, masses or pneumothorax. No pleural effusion.

MEDIASTINUM AND HILAR STRUCTURES: No masses or contour abnormalities.

HEART AND VASCULAR STRUCTURES: Heart normal size.  No evidence for failure.

BONES: No acute findings.

HARDWARE: None in the chest.

OTHER: No other significant finding.



IMPRESSION:  NO ACUTE RADIOGRAPHIC FINDING IN THE CHEST.



TECHNICAL DOCUMENTATION:  JOB ID:  [id number]

 2011 [employer]- All Rights Reserved



Reading location - IP/workstation name: [email or web address]

## 2018-12-20 NOTE — RADIOLOGY REPORT (SQ)
EXAM DESCRIPTION:  CT ABD/PELVIS WITH IV ONLY



COMPLETED DATE/TIME:  12/20/2018 5:50 pm



REASON FOR STUDY:  Rt abd pain, h/o hypotensive episode



COMPARISON:  None.



TECHNIQUE:  CT scan of the abdomen and pelvis performed using helical scanning technique with dynamic
 intravenous contrast injection.  No oral contrast. Images reviewed with lung, soft tissue, and bone 
windows. Reconstructed coronal and sagittal MPR images reviewed. Delayed images for evaluation of the
 urinary system also acquired. All images stored on PACS.

All CT scanners at this facility use dose modulation, iterative reconstruction, and/or weight based d
osing when appropriate to reduce radiation dose to as low as reasonably achievable (ALARA).

[facility]: Dose Right  [facility]: CareDose    [facility]: Dose Right    [facility]: Teradose 4D    [facility]: Smart Technologies



CONTRAST TYPE AND DOSE:  contrast/concentration: Isovue 350.00 mg/ml; Total Contrast Delivered: 90.0 
ml; Total Saline Delivered: 70.0 ml



RENAL FUNCTION:  Not recorded here.  Refer to CT technologist's record.



RADIATION DOSE:  CT Rad equipment meets quality standard of care and radiation dose reduction techniq
ues were employed. CTDIvol: 6.4 - 8.9 mGy. DLP: 809 mGy-cm..



LIMITATIONS:  None.



FINDINGS:  LOWER CHEST: No significant findings. No nodules or infiltrates.

LIVER: Normal size.  No masses.  There is air in the bile ducts.

SPLEEN: Normal size. No focal lesions.

PANCREAS: No masses. No significant calcifications. No adjacent inflammation or peripancreatic fluid 
collections. Pancreatic duct not dilated.

GALLBLADDER: There is surgical clips in the gallbladder fossa.

ADRENAL GLANDS: No significant masses or asymmetry.

RIGHT KIDNEY AND URETER: No solid masses.   No significant calcifications.   No hydronephrosis or hyd
roureter.

LEFT KIDNEY AND URETER: No solid masses.   No significant calcifications.   No hydronephrosis or hydr
oureter.

AORTA AND VESSELS: No aneurysm.  Atherosclerosis.  There is considerable atherosclerosis in the SMA.

RETROPERITONEUM: No retroperitoneal adenopathy, hemorrhage or masses.

BOWEL AND PERITONEAL CAVITY: No masses or inflammatory changes. No free fluid or peritoneal masses.

APPENDIX: Not identified.

PELVIS: No mass.  No free fluid. Normal bladder.

ABDOMINAL WALL: No masses. No hernias.

BONES: No significant or acute findings.

OTHER: No other significant finding.



IMPRESSION:  There is air in the hepatic ducts.  Has the patient had a sphincterotomy?

There is atherosclerosis.



TECHNICAL DOCUMENTATION:  JOB ID:  [id number]

Quality ID # [id number]: Final reports with documentation of one or more dose reduction techniques (e.g., Au
tomated exposure control, adjustment of the mA and/or kV according to patient size, use of iterative 
reconstruction technique)

 2011 [employer]- All Rights Reserved



Reading location - IP/workstation name: [email or web address]

## 2019-06-29 ENCOUNTER — HOSPITAL ENCOUNTER (EMERGENCY)
Dept: HOSPITAL 62 - ER | Age: 64
Discharge: HOME | End: 2019-06-29
Payer: OTHER GOVERNMENT

## 2019-06-29 VITALS — SYSTOLIC BLOOD PRESSURE: 148 MMHG | DIASTOLIC BLOOD PRESSURE: 52 MMHG

## 2019-06-29 DIAGNOSIS — R10.11: Primary | ICD-10-CM

## 2019-06-29 DIAGNOSIS — I25.10: ICD-10-CM

## 2019-06-29 DIAGNOSIS — I10: ICD-10-CM

## 2019-06-29 DIAGNOSIS — E11.9: ICD-10-CM

## 2019-06-29 DIAGNOSIS — R19.7: ICD-10-CM

## 2019-06-29 DIAGNOSIS — F17.200: ICD-10-CM

## 2019-06-29 LAB
ADD MANUAL DIFF: NO
ALBUMIN SERPL-MCNC: 4.2 G/DL (ref 3.5–5)
ALP SERPL-CCNC: 116 U/L (ref 38–126)
ALT SERPL-CCNC: 76 U/L (ref 21–72)
ANION GAP SERPL CALC-SCNC: 9 MMOL/L (ref 5–19)
AST SERPL-CCNC: 161 U/L (ref 17–59)
BASOPHILS # BLD AUTO: 0.1 10^3/UL (ref 0–0.2)
BASOPHILS NFR BLD AUTO: 1.3 % (ref 0–2)
BILIRUB DIRECT SERPL-MCNC: 0.4 MG/DL (ref 0–0.4)
BILIRUB SERPL-MCNC: 1.6 MG/DL (ref 0.2–1.3)
BUN SERPL-MCNC: 14 MG/DL (ref 7–20)
CALCIUM: 9.8 MG/DL (ref 8.4–10.2)
CHLORIDE SERPL-SCNC: 103 MMOL/L (ref 98–107)
CO2 SERPL-SCNC: 28 MMOL/L (ref 22–30)
EOSINOPHIL # BLD AUTO: 0.2 10^3/UL (ref 0–0.6)
EOSINOPHIL NFR BLD AUTO: 2 % (ref 0–6)
ERYTHROCYTE [DISTWIDTH] IN BLOOD BY AUTOMATED COUNT: 15.6 % (ref 11.5–14)
GLUCOSE SERPL-MCNC: 136 MG/DL (ref 75–110)
HCT VFR BLD CALC: 44.9 % (ref 37.9–51)
HGB BLD-MCNC: 15.2 G/DL (ref 13.5–17)
LYMPHOCYTES # BLD AUTO: 2.5 10^3/UL (ref 0.5–4.7)
LYMPHOCYTES NFR BLD AUTO: 22.1 % (ref 13–45)
MCH RBC QN AUTO: 29.3 PG (ref 27–33.4)
MCHC RBC AUTO-ENTMCNC: 33.8 G/DL (ref 32–36)
MCV RBC AUTO: 87 FL (ref 80–97)
MONOCYTES # BLD AUTO: 1 10^3/UL (ref 0.1–1.4)
MONOCYTES NFR BLD AUTO: 9.1 % (ref 3–13)
NEUTROPHILS # BLD AUTO: 7.3 10^3/UL (ref 1.7–8.2)
NEUTS SEG NFR BLD AUTO: 65.5 % (ref 42–78)
PLATELET # BLD: 269 10^3/UL (ref 150–450)
POTASSIUM SERPL-SCNC: 4.3 MMOL/L (ref 3.6–5)
PROT SERPL-MCNC: 7.2 G/DL (ref 6.3–8.2)
RBC # BLD AUTO: 5.19 10^6/UL (ref 4.35–5.55)
SODIUM SERPL-SCNC: 139.6 MMOL/L (ref 137–145)
TOTAL CELLS COUNTED % (AUTO): 100 %
WBC # BLD AUTO: 11.1 10^3/UL (ref 4–10.5)

## 2019-06-29 PROCEDURE — 80053 COMPREHEN METABOLIC PANEL: CPT

## 2019-06-29 PROCEDURE — 85025 COMPLETE CBC W/AUTO DIFF WBC: CPT

## 2019-06-29 PROCEDURE — 36415 COLL VENOUS BLD VENIPUNCTURE: CPT

## 2019-06-29 PROCEDURE — 99284 EMERGENCY DEPT VISIT MOD MDM: CPT

## 2019-06-29 PROCEDURE — 96374 THER/PROPH/DIAG INJ IV PUSH: CPT

## 2019-06-29 PROCEDURE — 83690 ASSAY OF LIPASE: CPT

## 2019-06-29 NOTE — ER DOCUMENT REPORT
ED Medical Screen (RME)





- General


Chief Complaint: Abdominal Pain


Stated Complaint: ABDOMINAL PAIN


Time Seen by Provider: 06/29/19 12:09


Primary Care Provider: 


WILLIAMS,LENNOX, MD [Primary Care Provider] - Follow up as needed


Mode of Arrival: Medic


Information source: Patient


Notes: 





Patient presents emergency department with complaints of right upper quad 

abdominal pain with diarrhea that started approximately 1/2 hours ago.  Reports 

it woke him up from his sleep.  Reports the pain is sharp when it comes makes 

him break out in a cold sweat.  Denies shortness of breath, chest pain, fever 

vomiting.  Denies recent trip denies trauma.  Patient is a diabetic. 








I have greeted and performed a rapid initial assessment of this patient.  A 

comprehensive ED assessment and evaluation of the patient, analysis of test 

results and completion of the medical decision making process will be conducted 

by additional ED providers.  Dictation of this chart was performed using voice 

recognition software; therefore, there may be some unintended grammatical 

errors.


TRAVEL OUTSIDE OF THE [place] IN LAST 30 DAYS: No





- Related Data


Allergies/Adverse Reactions: 


                                        





No Known Allergies Allergy (Verified 06/29/19 11:43)


   











Past Medical History





- Past Medical History


Cardiac Medical History: Reports: Hx Coronary Artery Disease - CABG x3, Hx 

Hypertension, Hx Peripheral Vascular Disease - Left lower extremity stent placed

about 2 months ago. Right lower extremity


Endocrine Medical History: Reports: Hx Diabetes Mellitus Type 2


Renal/ Medical History: Denies: Hx Peritoneal Dialysis


Psychiatric Medical History: Reports: Hx Depression


Past Surgical History: Reports: Hx Cardiac Catheterization - stent, Hx Cardiac 

Surgery - CABG, Hx Cholecystectomy, Hx Coronary Artery Bypass Graft - x3, Other 

- Right toe amputation as diabetic complication





Physical Exam





- Vital signs


Vitals: 





                                        











Temp Pulse Resp BP Pulse Ox


 


 97.5 F   66   16   98/47 L  95 


 


 06/29/19 11:58  06/29/19 11:58  06/29/19 11:58  06/29/19 11:58  06/29/19 11:58














Course





- Vital Signs


Vital signs: 





                                        











Temp Pulse Resp BP Pulse Ox


 


 97.5 F   66   16   98/47 L  95 


 


 06/29/19 11:58  06/29/19 11:58  06/29/19 11:58  06/29/19 11:58  06/29/19 11:58














Doctor's Discharge





- Discharge


Referrals: 


WILLIAMS,LENNOX, MD [Primary Care Provider] - Follow up as needed

## 2019-06-29 NOTE — ER DOCUMENT REPORT
ED General





- General


Chief Complaint: Abdominal Pain


Stated Complaint: ABDOMINAL PAIN


Time Seen by Provider: 06/29/19 12:09


Primary Care Provider: 


WILLIAMS,LENNOX, MD [ACTIVE STAFF] - Follow up as needed


Mode of Arrival: Medic


TRAVEL OUTSIDE OF THE [place] IN LAST 30 DAYS: No





- HPI


Notes: 





Patient is a 64-year-old gentleman who presents to the emergency department for 

evaluation.  He got home at about 4:30 AM, was picking up a family member in 

[place].  He states that around 6 AM he had 3 episodes of diarrhea.  He denies 

any melena or hematochezia.  He states he was finally able to go to sleep, but 

woke up suddenly at 10 AM with sharp right upper quadrant pain.  He states that 

the pain was a 10 out of a 10 initially, it is now 3 out of 10.  He denies any 

fevers or chills.  No nausea or vomiting.  Normal appetite.  No foreign travel, 

no recent antibiotics, no abnormal ingestions.  He did take some Imodium for the

diarrhea earlier, and it seems to have resolved.





- Related Data


Allergies/Adverse Reactions: 


                                        





No Known Allergies Allergy (Verified 06/29/19 11:43)


   











Past Medical History





- General


Information source: Patient





- Social History


Smoking Status: Current Every Day Smoker


Frequency of alcohol use: Rare


Drug Abuse: None


Family History: Reviewed & Not Pertinent


Patient has suicidal ideation: No


Patient has homicidal ideation: No





- Past Medical History


Cardiac Medical History: Reports: Hx Coronary Artery Disease - CABG x3, Hx 

Hypertension, Hx Peripheral Vascular Disease - Left lower extremity stent placed

about 2 months ago. Right lower extremity


Endocrine Medical History: Reports: Hx Diabetes Mellitus Type 2


Renal/ Medical History: Denies: Hx Peritoneal Dialysis


Psychiatric Medical History: Reports: Hx Depression


Past Surgical History: Reports: Hx Cardiac Catheterization - stent, Hx Cardiac 

Surgery - CABG, Hx Cholecystectomy, Hx Coronary Artery Bypass Graft - x3, Other 

- Right toe amputation as diabetic complication





Review of Systems





- Review of Systems


Constitutional: No symptoms reported


EENT: No symptoms reported


Cardiovascular: No symptoms reported


Respiratory: No symptoms reported


Gastrointestinal: See HPI


Genitourinary: No symptoms reported


Musculoskeletal: No symptoms reported


Skin: No symptoms reported


Neurological/Psychological: No symptoms reported





Physical Exam





- Vital signs


Vitals: 


                                        











Temp Pulse Resp BP Pulse Ox


 


 97.5 F   66   16   98/47 L  95 


 


 06/29/19 11:58  06/29/19 11:58  06/29/19 11:58  06/29/19 11:58  06/29/19 11:58














- Notes


Notes: 





Vital signs reviewed, please refer to chart. Head is normocephalic, atraumatic. 

Pupils equal round, reactive to light.  Neck is supple without meningismus.  

Heart is regular rate and rhythm.  Lungs are clear to auscultation bilaterally. 

Abdomen is soft, mildly tender in the epigastric and right upper quadrant, no 

rebound or guarding, normoactive bowel sounds throughout.  Extremities without 

cyanosis, clubbing. Posterior calves are nontender.  Peripheral pulses are 

equal.  Skin is warm and dry.  Patient is awake, alert, neurological exam is 

nonfocal.





Course





- Re-evaluation


Re-evalutation: 





06/29/19 13:46


Patient presents emergency department for evaluation of abdominal pain and 

diarrhea.  He has had his symptoms overall for under 7 hours.  His pain was 

sharp at first but is significantly improved without any sort of intervention.  

He only had 3 episodes of diarrhea, took Imodium, and has had none since then.  

He does not have a gallbladder.  His pancreatic lipase is normal.  Serial a

bdominal exams are nonsurgical.  I did discuss findings with the patient.  At 

this point he has no significant abnormalities that are concerning.  I did not 

feel an indication to image at this time, particularly given the short duration 

of symptoms.  I explained to him that if his symptoms should persist, or he 

should develop worsening, he should return.  He voiced understanding to this.  

Otherwise we will give him Bentyl here, have him follow-up with primary care, 

return to the ED with worsening or new concerning symptoms of any sort.





- Vital Signs


Vital signs: 


                                        











Temp Pulse Resp BP Pulse Ox


 


 97.5 F   66   16   98/47 L  95 


 


 06/29/19 12:21  06/29/19 12:21  06/29/19 12:21  06/29/19 12:21  06/29/19 12:21














- Laboratory


Result Diagrams: 


                                 06/29/19 12:10





                                 06/29/19 12:10


Laboratory results interpreted by me: 


                                        











  06/29/19 06/29/19





  12:10 12:10


 


WBC  11.1 H 


 


RDW  15.6 H 


 


Glucose   136 H


 


Total Bilirubin   1.6 H


 


AST   161 H


 


ALT   76 H














Discharge





- Discharge


Clinical Impression: 


 Right upper quadrant pain





Diarrhea


Qualifiers:


 Diarrhea type: unspecified type Qualified Code(s): R19.7 - Diarrhea, 

unspecified





Condition: Stable


Disposition: HOME, SELF-CARE


Instructions:  Abdominal Pain ([facility]), Antispasmodics ([facility]), Diarrhea, Nonspecific

([facility])


Additional Instructions: 


No clear cause was found for your pain or diarrhea today.  Rest, clear liquids, 

advance slowly to bland diet.  If your pain worsens, or you develop new or 

concerning symptoms of any sort, return immediately to the emergency department 

for reevaluation.


Referrals: 


WILLIAMS,LENNOX, MD [ACTIVE STAFF] - Follow up as needed

## 2019-08-09 ENCOUNTER — HOSPITAL ENCOUNTER (EMERGENCY)
Dept: HOSPITAL 62 - ER | Age: 64
Discharge: HOME | End: 2019-08-09
Payer: OTHER GOVERNMENT

## 2019-08-09 VITALS — SYSTOLIC BLOOD PRESSURE: 121 MMHG | DIASTOLIC BLOOD PRESSURE: 54 MMHG

## 2019-08-09 DIAGNOSIS — F17.200: ICD-10-CM

## 2019-08-09 DIAGNOSIS — I10: ICD-10-CM

## 2019-08-09 DIAGNOSIS — E11.51: ICD-10-CM

## 2019-08-09 DIAGNOSIS — L72.3: Primary | ICD-10-CM

## 2019-08-09 DIAGNOSIS — L02.212: ICD-10-CM

## 2019-08-09 DIAGNOSIS — I25.10: ICD-10-CM

## 2019-08-09 PROCEDURE — 10060 I&D ABSCESS SIMPLE/SINGLE: CPT

## 2019-08-09 PROCEDURE — 99283 EMERGENCY DEPT VISIT LOW MDM: CPT

## 2019-08-09 NOTE — ER DOCUMENT REPORT
ED General





- General


Chief Complaint: Abscess


Stated Complaint: ABSCESS/BACK


Time Seen by Provider: 08/09/19 09:06


Primary Care Provider: 


MORALES STYLES FNP [NO LOCAL MD] - Follow up as needed


Mode of Arrival: Ambulatory


TRAVEL OUTSIDE OF THE [place] IN LAST 30 DAYS: No





- HPI


Notes: 





Patient is a 64-year-old male who presents to the emergency department for 

evaluation.  He has had a cyst on his back for years.  Over the last week is 

gotten red, started draining.  No fevers or chills.  No nausea or vomiting.  

Eating and drinking normally.  Taking his medications without difficulty.  He is

currently on Plavix as well as Xarelto.  No history of abscesses or MRSA in the 

past.  He states his pain is severe, rates it a 10 out of 10.  States he has not

been sleeping as a result of it.  Wife admits she tried to squeeze it and was 

unsuccessful in obtaining any purulence.





- Related Data


Allergies/Adverse Reactions: 


                                        





No Known Allergies Allergy (Verified 08/09/19 08:55)


   











Past Medical History





- General


Information source: Patient





- Social History


Smoking Status: Current Every Day Smoker


Chew tobacco use (# tins/day): No


Family History: Reviewed & Not Pertinent


Patient has suicidal ideation: No


Patient has homicidal ideation: No





- Past Medical History


Cardiac Medical History: Reports: Hx Coronary Artery Disease - CABG x3, Hx 

Hypertension, Hx Peripheral Vascular Disease - Left lower extremity stent placed

about 2 months ago. Right lower extremity


Endocrine Medical History: Reports: Hx Diabetes Mellitus Type 2


Renal/ Medical History: Denies: Hx Peritoneal Dialysis


Psychiatric Medical History: Reports: Hx Depression


Past Surgical History: Reports: Hx Cardiac Catheterization - stent, Hx Cardiac 

Surgery - CABG, Hx Cholecystectomy, Hx Coronary Artery Bypass Graft - x3, Other 

- Right toe amputation as diabetic complication





Review of Systems





- Review of Systems


Constitutional: No symptoms reported


EENT: No symptoms reported


Cardiovascular: No symptoms reported


Respiratory: No symptoms reported


Gastrointestinal: No symptoms reported


Genitourinary: No symptoms reported


Musculoskeletal: No symptoms reported


Skin: See HPI


Neurological/Psychological: No symptoms reported





Physical Exam





- Vital signs


Vitals: 


                                        











Temp Pulse Resp BP Pulse Ox


 


 98.0 F   81   16   114/48 L  95 


 


 08/09/19 09:03  08/09/19 09:03  08/09/19 09:03  08/09/19 09:03  08/09/19 09:03














- Notes


Notes: 





This is a 64-year-old male who appears his stated age in no acute distress.  

Head is normocephalic and atraumatic.  Pupils are equal round reactive to light.

 Oral mucosa is moist.  Heart regular rate and rhythm, lungs clear to 

oscillation bilaterally.  Examination of the skin of the back healed in 

approximately 4 cm abscess, surrounding erythema mild induration.  There is some

mucopurulent drainage noted from the center of the wound, but it is only about a

1 mm area.





Course





- Re-evaluation


Re-evalutation: 





08/09/19 10:28


Patient presents emergency department for evaluation.  He is on multiple blood 

thinners, but this does not fact need an I&D.  He was given 2 Percocet, I&D set 

up in room.


08/09/19 11:32


Patient tolerated I&D well.  We will send him home with prescription for 

Bactrim.  He was told that he will likely need to see his surgeon/dermatologist 

for removal of this entire sebaceous cyst.  He voiced understanding, [facility] 

[facility]  He is to return to the ED with worsening or new 

concerning symptoms of any sort.





- Vital Signs


Vital signs: 


                                        











Temp Pulse Resp BP Pulse Ox


 


 98.0 F   81   16   114/48 L  95 


 


 08/09/19 09:03  08/09/19 09:03  08/09/19 09:03  08/09/19 09:03  08/09/19 09:03














Discharge





- Discharge


Clinical Impression: 


 Infected sebaceous cyst





Condition: Stable


Disposition: HOME, SELF-CARE


Instructions:  Abscess ([facility]), Post Incision and Drainage, Trimethoprim-Sulfa 

([facility])


Additional Instructions: 


You will likely need referral on to a surgeon for further treatment of this 

cyst.  Take all the antibiotics as prescribed until gone.  Keep area clean with 

soap and water.  If you develop fever, vomiting, or any other new concerning 

symptoms, return immediately to the emergency department for evaluation.


Referrals: 


MORALES STYLES FNP [NO LOCAL MD] - Follow up as needed

## 2019-08-09 NOTE — ER DOCUMENT REPORT
ED Medical Screen (RME)





- General


Chief Complaint: Abscess


Stated Complaint: ABSCESS/BACK


Time Seen by Provider: 08/09/19 09:06


Primary Care Provider: 


MORALES STYLES FNP [Primary Care Provider] - Follow up as needed


Mode of Arrival: Ambulatory


Information source: Patient


Notes: 





64-year-old male presented to ED for complaint of abscess to the middle of his 

back x1-1/2 weeks.  He states he got much worse 4 days ago.  He states it is now

very red swollen and leaking.  It is very painful to the palpation.  He denies 

any previous abscesses in the past.  He does have a history of diabetes high 

blood pressure high cholesterol coronary artery disease with triple bypass 

multiple venous stents throughout his body loss of his left great toe 

gallbladder surgery.  Patient is alert oriented respirations regular nonlabored 

speaking in full sentences walks with even steady gait.


TRAVEL OUTSIDE OF THE [place] IN LAST 30 DAYS: No





- Related Data


Allergies/Adverse Reactions: 


                                        





No Known Allergies Allergy (Verified 08/09/19 08:55)


   











Past Medical History





- Social History


Chew tobacco use (# tins/day): No





- Past Medical History


Cardiac Medical History: Reports: Hx Coronary Artery Disease - CABG x3, Hx Hyp

ertension, Hx Peripheral Vascular Disease - Left lower extremity stent placed 

about 2 months ago. Right lower extremity


Endocrine Medical History: Reports: Hx Diabetes Mellitus Type 2


Renal/ Medical History: Denies: Hx Peritoneal Dialysis


Psychiatric Medical History: Reports: Hx Depression


Past Surgical History: Reports: Hx Cardiac Catheterization - stent, Hx Cardiac 

Surgery - CABG, Hx Cholecystectomy, Hx Coronary Artery Bypass Graft - x3, Other 

- Right toe amputation as diabetic complication





Physical Exam





- Vital signs


Vitals: 





                                        











Temp Pulse Resp BP Pulse Ox


 


 98.0 F   81   16   114/48 L  95 


 


 08/09/19 09:03  08/09/19 09:03  08/09/19 09:03  08/09/19 09:03  08/09/19 09:03














Course





- Vital Signs


Vital signs: 





                                        











Temp Pulse Resp BP Pulse Ox


 


 98.0 F   81   16   114/48 L  95 


 


 08/09/19 09:03  08/09/19 09:03  08/09/19 09:03  08/09/19 09:03  08/09/19 09:03














Doctor's Discharge





- Discharge


Referrals: 


MORALES STYLES FNP [Primary Care Provider] - Follow up as needed

## 2019-08-21 ENCOUNTER — HOSPITAL ENCOUNTER (EMERGENCY)
Dept: HOSPITAL 62 - ER | Age: 64
Discharge: HOME | End: 2019-08-21
Payer: OTHER GOVERNMENT

## 2019-08-21 VITALS — SYSTOLIC BLOOD PRESSURE: 144 MMHG | DIASTOLIC BLOOD PRESSURE: 61 MMHG

## 2019-08-21 DIAGNOSIS — F17.200: ICD-10-CM

## 2019-08-21 DIAGNOSIS — I25.10: ICD-10-CM

## 2019-08-21 DIAGNOSIS — Z98.890: ICD-10-CM

## 2019-08-21 DIAGNOSIS — L97.519: ICD-10-CM

## 2019-08-21 DIAGNOSIS — E11.9: ICD-10-CM

## 2019-08-21 DIAGNOSIS — E11.621: ICD-10-CM

## 2019-08-21 DIAGNOSIS — I10: ICD-10-CM

## 2019-08-21 DIAGNOSIS — M79.671: ICD-10-CM

## 2019-08-21 DIAGNOSIS — I73.9: Primary | ICD-10-CM

## 2019-08-21 DIAGNOSIS — Z79.01: ICD-10-CM

## 2019-08-21 LAB
ADD MANUAL DIFF: NO
ALBUMIN SERPL-MCNC: 4.3 G/DL (ref 3.5–5)
ALP SERPL-CCNC: 109 U/L (ref 38–126)
ANION GAP SERPL CALC-SCNC: 11 MMOL/L (ref 5–19)
APTT BLD: 30.9 SEC (ref 23.5–35.8)
AST SERPL-CCNC: 16 U/L (ref 17–59)
BASOPHILS # BLD AUTO: 0.1 10^3/UL (ref 0–0.2)
BASOPHILS NFR BLD AUTO: 0.8 % (ref 0–2)
BILIRUB DIRECT SERPL-MCNC: 0.2 MG/DL (ref 0–0.4)
BILIRUB SERPL-MCNC: 0.6 MG/DL (ref 0.2–1.3)
BUN SERPL-MCNC: 12 MG/DL (ref 7–20)
CALCIUM: 10.2 MG/DL (ref 8.4–10.2)
CHLORIDE SERPL-SCNC: 98 MMOL/L (ref 98–107)
CO2 SERPL-SCNC: 32 MMOL/L (ref 22–30)
EOSINOPHIL # BLD AUTO: 0.2 10^3/UL (ref 0–0.6)
EOSINOPHIL NFR BLD AUTO: 1.1 % (ref 0–6)
ERYTHROCYTE [DISTWIDTH] IN BLOOD BY AUTOMATED COUNT: 15.5 % (ref 11.5–14)
GLUCOSE SERPL-MCNC: 215 MG/DL (ref 75–110)
HCT VFR BLD CALC: 45.5 % (ref 37.9–51)
HGB BLD-MCNC: 15.1 G/DL (ref 13.5–17)
INR PPP: 0.99
LYMPHOCYTES # BLD AUTO: 2.7 10^3/UL (ref 0.5–4.7)
LYMPHOCYTES NFR BLD AUTO: 17.4 % (ref 13–45)
MCH RBC QN AUTO: 29.3 PG (ref 27–33.4)
MCHC RBC AUTO-ENTMCNC: 33.3 G/DL (ref 32–36)
MCV RBC AUTO: 88 FL (ref 80–97)
MONOCYTES # BLD AUTO: 1.2 10^3/UL (ref 0.1–1.4)
MONOCYTES NFR BLD AUTO: 7.8 % (ref 3–13)
NEUTROPHILS # BLD AUTO: 11.2 10^3/UL (ref 1.7–8.2)
NEUTS SEG NFR BLD AUTO: 72.9 % (ref 42–78)
PLATELET # BLD: 323 10^3/UL (ref 150–450)
POTASSIUM SERPL-SCNC: 4.4 MMOL/L (ref 3.6–5)
PROT SERPL-MCNC: 7.3 G/DL (ref 6.3–8.2)
PROTHROMBIN TIME: 13.1 SEC (ref 11.4–15.4)
RBC # BLD AUTO: 5.18 10^6/UL (ref 4.35–5.55)
TOTAL CELLS COUNTED % (AUTO): 100 %
WBC # BLD AUTO: 15.3 10^3/UL (ref 4–10.5)

## 2019-08-21 PROCEDURE — 36415 COLL VENOUS BLD VENIPUNCTURE: CPT

## 2019-08-21 PROCEDURE — 99284 EMERGENCY DEPT VISIT MOD MDM: CPT

## 2019-08-21 PROCEDURE — 85610 PROTHROMBIN TIME: CPT

## 2019-08-21 PROCEDURE — 87040 BLOOD CULTURE FOR BACTERIA: CPT

## 2019-08-21 PROCEDURE — 96376 TX/PRO/DX INJ SAME DRUG ADON: CPT

## 2019-08-21 PROCEDURE — 85730 THROMBOPLASTIN TIME PARTIAL: CPT

## 2019-08-21 PROCEDURE — 96367 TX/PROPH/DG ADDL SEQ IV INF: CPT

## 2019-08-21 PROCEDURE — 80053 COMPREHEN METABOLIC PANEL: CPT

## 2019-08-21 PROCEDURE — 73630 X-RAY EXAM OF FOOT: CPT

## 2019-08-21 PROCEDURE — 96365 THER/PROPH/DIAG IV INF INIT: CPT

## 2019-08-21 PROCEDURE — 85025 COMPLETE CBC W/AUTO DIFF WBC: CPT

## 2019-08-21 PROCEDURE — 93926 LOWER EXTREMITY STUDY: CPT

## 2019-08-21 NOTE — ER DOCUMENT REPORT
ED Extremity Problem, Lower





- General


Chief Complaint: Foot Pain


Stated Complaint: RIGHT FOOT PAIN


Time Seen by Provider: 08/21/19 17:36


Primary Care Provider: 


[facility] [Primary Care Provider] - Follow up as needed


Notes: 





Patient is a 64-year-old male who presents emergency department with a chief 

complaint of right foot pain.  His pain started this morning.  He has a history 

of peripheral vascular disease and is what prompted him to come to the emergency

department.  He states that he has had a sore in his right second toe for the 

past week.  States that there is some new redness.  Patient has a past surgical 

history of a right lower extremity stent and a left lower extremity graft.  He 

is currently on Plavix and Xarelto.  Patient has a history of a triple bypass, 

cholecystectomy.  Denies any fever, body aches, chills or any other symptoms.


TRAVEL OUTSIDE OF THE [place] IN LAST 30 DAYS: No





- Related Data


Allergies/Adverse Reactions: 


                                        





No Known Allergies Allergy (Verified 08/21/19 17:18)


   











Past Medical History





- General


Information source: Patient





- Social History


Smoking Status: Current Every Day Smoker


Frequency of alcohol use: None


Drug Abuse: None


Family History: Reviewed & Not Pertinent


Patient has suicidal ideation: No


Patient has homicidal ideation: No





- Past Medical History


Cardiac Medical History: Reports: Hx Coronary Artery Disease - CABG x3, Hx Hyper

tension, Hx Peripheral Vascular Disease - Left lower extremity stent placed 

about 2 months ago. Right lower extremity


Endocrine Medical History: Reports: Hx Diabetes Mellitus Type 2


Renal/ Medical History: Denies: Hx Peritoneal Dialysis


Psychiatric Medical History: Reports: Hx Depression


Past Surgical History: Reports: Hx Cardiac Catheterization - stent, Hx Cardiac 

Surgery - CABG, Hx Cholecystectomy, Hx Coronary Artery Bypass Graft - x3, Other 

- Right toe amputation as diabetic complication





Review of Systems





- Review of Systems


Notes: 





REVIEW OF SYSTEMS:





CONSTITUTIONAL :    Denies recent illness.  Denies recent unintentional weight 

loss.  Denies fever,  chills, or sweats. 


EENT: Denies eye, ear, throat, or mouth pain, discharge, or symptoms.  Denies 

nasal or sinus congestion.


CARDIOVASCULAR:  Denies chest pain.


RESPIRATORY: Denies shortness of breath, cough, congestion, difficulty 

breathing, or wheezing. 


GASTROINTESTINAL: Denies nausea, vomiting, and diarrhea.  Denies abdominal pain.

 Denies constipation. 


GENITOURINARY:  Denies difficulty urinating, burning, blood in urine, urgency or

frequency.


MUSCULOSKELETAL: See HPI


SKIN:   Denies rash, itchiness, or lesions


HEMATOLOGIC :   Denies easy bruising or bleeding.


LYMPHATIC:  Denies swollen, painful, enlarged glands.


NEUROLOGICAL: Denies no numbness or tingling denies weakness.  Denies headache. 

Denies altered mental status.  Denies alteration in speech.


PSYCHIATRIC:  Denies stress, anxiety, alteration in sleep patterns, or 

depression.





All other systems reviewed and negative.





Physical Exam





- Vital signs


Vitals: 


                                        











Temp Pulse Resp BP Pulse Ox


 


 97.7 F   81   16   121/53 L  96 


 


 08/21/19 17:23  08/21/19 17:23  08/21/19 17:23  08/21/19 17:23  08/21/19 17:23














- Notes


Notes: 


PHYSICAL EXAMINATION:





GENERAL: Appears stated age, well-nourished, no acute distress. 





HEAD:  Normocephalic, atraumatic.





EYES:  PERRL, conjunctiva normal, all extraocular movements intact, sclera 

nonicteric





ENT:  Moist mucous membranes. 





NECK: Supple, no noticeable swelling, redness, rash.  Normal range of motion.





LUNGS: Equal breath sounds bilaterally and clear to auscultation.  No wheezes 

rales or rhonchi.





CARDIOVASCULAR: S1-S2, regular rate, regular rhythm.  Radial pulses 2+, normal. 

Absent right dorsalis pedis and posterior tibial pulses





ABDOMEN: Normoactive bowel sounds.  Soft, nontender,  no guarding, no rebound 

tenderness, and no masses palpated.





EXTREMITIES: Normal strength and range of motion, no pitting or edema.  No 

cyanosis. 





NEUROLOGICAL: Moves all extremities upon command.  Strength 5/5 in all 

extremities. 





PSYCH: Normal mood, normal affect.





SKIN: Warm, dry.  Erythema with streaking noted to right dorsal foot from second

toe up to foot.  Diabetic foot ulcer noted to tip of right second toe.








Course





- Re-evaluation


Re-evalutation: 





08/21/19 20:26


Dr. Duarte called me with a critical finding of a complete right femoral 

occlusion.  Dr. Last, my attending physician will be consulted.  


08/21/19 20:35


I spoke with Dr. Last and the patient will be started on a Heparin drip. Critical access hospital will be called for transfer.The patient has a 

leukocytosis of 15,000.  Chemistries are unremarkable.  Coagulation studies are 

normal.


08/21/19 20:38


I spoke with  Critical access hospital, but there are no open beds at

this time, was not able to speak with the vacular surgeon on call. Will attempt 

[facility].


08/21/19 20:44


I spoke with Louise from [facility].  The vascular surgeon on-call 

is Dr. Kurtz.  The images will be sent and Dr. Kurtz will give me a call back.


08/21/19 21:32


I spoke with Dr. Kurtz, the vascular surgeon at [facility].  And he 

states that the patient still has good collateral flow of his arteries.  He 

would like to see the patient outpatient first thing in the morning and will 

assess him then. Dr. Kurtz would also like the patient to stop his 

anticoaulation medications because they are not able to perform surgery until 

has has been off his Xeralto for a few days.  I then relayed this information to

Dr. Last and he is stating that due to the patient having a diabetic foot ulcer

with poor blood flow and a history of poor blood flow, it would be best to have 

the patient admitted for IV antibiotics.  I will call Dr. Weiland.


08/21/19 21:56


I spoke with Dr. Weiland, hospitalist physician and he does not want to admit 

the patient at that time.  He is recommending that the patient still follow-up 

with vascular surgeon in the morning and be treated outpatient with clindamycin.

 I have put in wound care orders for the patient's diabetic toe ulcer.  I disc

ussed with the patient the importance of following up with [facility].  He states that he will go there first thing in the morning.  Follow-up 

precautions were given.  Verbal discharge instructions were given to the 

patient.  They verbalized understanding.  They are stable for discharge.














- Vital Signs


Vital signs: 


                                        











Temp Pulse Resp BP Pulse Ox


 


 98.5 F   81   15   144/61 H  97 


 


 08/21/19 23:43  08/21/19 17:23  08/21/19 23:12  08/21/19 23:12  08/21/19 23:12














- Laboratory


Result Diagrams: 


                                 08/21/19 18:26





                                 08/21/19 18:26


Laboratory results interpreted by me: 


                                        











  08/21/19 08/21/19





  18:26 18:26


 


WBC  15.3 H 


 


RDW  15.5 H 


 


Absolute Neuts (auto)  11.2 H 


 


Carbon Dioxide   32 H


 


Glucose   215 H


 


AST   16 L














Discharge





- Discharge


Clinical Impression: 


 Peripheral vascular disease





Diabetic foot ulcer


Qualifiers:


 Diabetic foot ulcer location: toe Diabetes mellitus type: type 2 Laterality: 

right Non-pressure ulcer stage: unspecified non-pressure ulcer stage Qualified 

Code(s): E11.621 - Type 2 diabetes mellitus with foot ulcer





Condition: Stable


Disposition: HOME, SELF-CARE


Additional Instructions: 


You were seen today in the emergency department for right foot pain.  It is 

imperative that you follow-up with the vascular surgeon below in regards to this

visit.  Please follow-up with him first thing in the morning.  The vascular 

surgeon's instructions are to not take your anticoagulant medications.  You are 

also being started on antibiotics for your diabetic foot ulcer.  Please have 

your antibiotics filled in the morning.  You received a dose of antibiotics here

in the emergency department.  You are also being sent home with pain medication.

 You can take 1 tablet every 4-6 hours as needed for your pain.  If you develop 

shortness of breath, difficulty breathing, worsening pain, or have any symptoms 

that are worrisome to you, please return to the emergency department.





Office Hours: 8AM-5PM M-F





Surgery - vascular surgery 


[address], [place] [address] [phone]








***DR. KURTZ WANTS TO SEE THE PATIENT FIRST THING IN THE MORNING***


Prescriptions: 


Clindamycin HCl [Cleocin 150 mg Capsule] 300 mg PO Q6 7 Days #56 capsule


Referrals: 


[facility] [Primary Care Provider] - Follow up as needed

## 2019-08-21 NOTE — RADIOLOGY REPORT (SQ)
EXAM DESCRIPTION:  FOOT RIGHT COMPLETE



COMPLETED DATE/TIME:  8/21/2019 6:40 pm



REASON FOR STUDY:  diabetic wound 2nd toe



COMPARISON:  None.



NUMBER OF VIEWS:  Three views.



TECHNIQUE:  AP, lateral and oblique  radiographic images acquired of the right foot.



LIMITATIONS:  None.



FINDINGS:  MINERALIZATION: Normal.

BONES: No acute fracture or dislocation.  No chi 2nd toe osseous destruction.

JOINTS: No effusions.

SOFT TISSUES: No soft tissue swelling.  No foreign body.  Vascular calcifications.

OTHER: No other significant finding.



IMPRESSION:  No definite radiographic findings of acute osteomyelitis involving the 2nd toe.



TECHNICAL DOCUMENTATION:  JOB ID:  [id number]

 2011 [employer]- All Rights Reserved



Reading location - IP/workstation name: [email or web address]

## 2019-08-21 NOTE — ER DOCUMENT REPORT
ED Medical Screen (RME)





- General


Chief Complaint: Foot Pain


Stated Complaint: RIGHT FOOT PAIN


Time Seen by Provider: 08/21/19 17:36


Primary Care Provider: 


[facility] [Primary Care Provider] - Follow up as needed


TRAVEL OUTSIDE OF THE [place] IN LAST 30 DAYS: No





- HPI


Notes: 





08/21/19 17:41


Patient is a 64-year-old male with history of type 2 diabetes, peripheral 

arterial disease with grafting to the left lower extremity and stents to the 

right lower extremity, coronary artery disease with bypass, hypertension who 

presents complaining of wound to his right second toe that is been present for 

the past week status post cut to the toe.  Patient states that he has had 

increased pain to his right leg when he ambulates as well with 

cramping/claudication.  Patient is afraid that his arteries may be blocked again

to his right leg.  Denies drug allergies.  Denies HA, fever, neck pain, URI, CP,

SOB, Abd pain, dysuria, back pain, or rash.





I have treated and performed a rapid initial assessment of this patient.  A 

comprehensive ED assessment and evaluation of the patient, analysis of test 

results and completion of medical decision making process will be conducted by 

additional ED providers.





PHYSICAL EXAMINATION:





GENERAL: Well-appearing, well-nourished and in no acute distress.  A&Ox4.  

Answers questions appropriately.





LUNGS: Breath sounds clear to auscultation bilaterally and equal.  No wheezes 

rales or rhonchi.





HEART: Regular rate and rhythm 





RLE:  no palpable pulses noted.  Cap refill <3 seconds.  There is an ulcerated 

wound 2nd medial toe.  + erythema/warmth to the foot.





- Related Data


Allergies/Adverse Reactions: 


                                        





No Known Allergies Allergy (Verified 08/21/19 17:18)


   











Past Medical History





- Past Medical History


Cardiac Medical History: Reports: Hx Coronary Artery Disease - CABG x3, Hx 

Hypertension, Hx Peripheral Vascular Disease - Left lower extremity stent placed

about 2 months ago. Right lower extremity


Endocrine Medical History: Reports: Hx Diabetes Mellitus Type 2


Renal/ Medical History: Denies: Hx Peritoneal Dialysis


Psychiatric Medical History: Reports: Hx Depression


Past Surgical History: Reports: Hx Cardiac Catheterization - stent, Hx Cardiac 

Surgery - CABG, Hx Cholecystectomy, Hx Coronary Artery Bypass Graft - x3, Other 

- Right toe amputation as diabetic complication





Physical Exam





- Vital signs


Vitals: 





                                        











Temp Pulse Resp BP Pulse Ox


 


 97.7 F   81   16   121/53 L  96 


 


 08/21/19 17:23  08/21/19 17:23  08/21/19 17:23  08/21/19 17:23  08/21/19 17:23














Course





- Vital Signs


Vital signs: 





                                        











Temp Pulse Resp BP Pulse Ox


 


 97.7 F   81   16   121/53 L  96 


 


 08/21/19 17:23  08/21/19 17:23  08/21/19 17:23  08/21/19 17:23  08/21/19 17:23














Doctor's Discharge





- Discharge


Referrals: 


[facility] [Primary Care Provider] - Follow up as needed

## 2019-08-22 NOTE — RADIOLOGY REPORT (SQ)
EXAM DESCRIPTION: 



US LOWER EXTREMITY ARTERIES LIMITED/UNILATERAL/FOLLOW UP



COMPLETED DATE/TME:  08/21/2019 17:40



CLINICAL HISTORY: 



64 years, Male, PAD RLE, no palp pulses



COMPARISON:

None.



TECHNIQUE:

Transverse and longitudinal sonographic images of the right lower

extremity arterial system



LIMITATIONS:

None.



FINDINGS:



Atheromatous changes of the common femoral artery with monophasic

waveforms. There is occlusion of the superficial femoral artery

in its entirety. The popliteal artery appears occluded as well.

There is equivocal visible flow associated with the posterior

tibial artery and portions of the distal popliteal artery.

However, accurate peak systolic velocities cannot be obtained due

to significantly minimal flow.



IMPRESSION:



The common femoral artery appears patent proximally with

occlusion distally and subsequent occlusion of the superficial

femoral artery, popliteal artery, with only minimal visible flow

involving the posterior tibial artery.

 



copyright 2011 [employer]- All Rights Reserved